# Patient Record
Sex: MALE | Race: WHITE | Employment: FULL TIME | URBAN - METROPOLITAN AREA
[De-identification: names, ages, dates, MRNs, and addresses within clinical notes are randomized per-mention and may not be internally consistent; named-entity substitution may affect disease eponyms.]

---

## 2020-12-27 ENCOUNTER — TELEPHONE (OUTPATIENT)
Dept: BEHAVIORAL HEALTH | Facility: CLINIC | Age: 35
End: 2020-12-27

## 2020-12-27 ENCOUNTER — HOSPITAL ENCOUNTER (INPATIENT)
Facility: CLINIC | Age: 35
LOS: 3 days | Discharge: HOME | DRG: 897 | End: 2020-12-30
Attending: FAMILY MEDICINE | Admitting: PSYCHIATRY & NEUROLOGY

## 2020-12-27 DIAGNOSIS — Z20.828 EXPOSURE TO SARS-ASSOCIATED CORONAVIRUS: ICD-10-CM

## 2020-12-27 DIAGNOSIS — F10.239 ALCOHOL DEPENDENCE WITH WITHDRAWAL WITH COMPLICATION (H): ICD-10-CM

## 2020-12-27 DIAGNOSIS — F32.1 CURRENT MODERATE EPISODE OF MAJOR DEPRESSIVE DISORDER, UNSPECIFIED WHETHER RECURRENT (H): Primary | ICD-10-CM

## 2020-12-27 LAB
ALBUMIN SERPL-MCNC: 3.6 G/DL (ref 3.4–5)
ALP SERPL-CCNC: 86 U/L (ref 40–150)
ALT SERPL W P-5'-P-CCNC: 119 U/L (ref 0–70)
AMPHETAMINES UR QL SCN: NEGATIVE
ANION GAP SERPL CALCULATED.3IONS-SCNC: 8 MMOL/L (ref 3–14)
AST SERPL W P-5'-P-CCNC: 124 U/L (ref 0–45)
BARBITURATES UR QL: NEGATIVE
BASOPHILS # BLD AUTO: 0.1 10E9/L (ref 0–0.2)
BASOPHILS NFR BLD AUTO: 1 %
BENZODIAZ UR QL: POSITIVE
BILIRUB SERPL-MCNC: 0.7 MG/DL (ref 0.2–1.3)
BUN SERPL-MCNC: 6 MG/DL (ref 7–30)
CALCIUM SERPL-MCNC: 9.6 MG/DL (ref 8.5–10.1)
CANNABINOIDS UR QL SCN: NEGATIVE
CHLORIDE SERPL-SCNC: 101 MMOL/L (ref 94–109)
CO2 SERPL-SCNC: 28 MMOL/L (ref 20–32)
COCAINE UR QL: NEGATIVE
CREAT SERPL-MCNC: 0.77 MG/DL (ref 0.66–1.25)
DIFFERENTIAL METHOD BLD: ABNORMAL
EOSINOPHIL # BLD AUTO: 0 10E9/L (ref 0–0.7)
EOSINOPHIL NFR BLD AUTO: 0.4 %
ERYTHROCYTE [DISTWIDTH] IN BLOOD BY AUTOMATED COUNT: 11.9 % (ref 10–15)
ETHANOL SERPL-MCNC: <0.01 G/DL
ETHANOL UR QL SCN: NEGATIVE
GFR SERPL CREATININE-BSD FRML MDRD: >90 ML/MIN/{1.73_M2}
GLUCOSE SERPL-MCNC: 105 MG/DL (ref 70–99)
HCT VFR BLD AUTO: 40.9 % (ref 40–53)
HGB BLD-MCNC: 13.6 G/DL (ref 13.3–17.7)
IMM GRANULOCYTES # BLD: 0 10E9/L (ref 0–0.4)
IMM GRANULOCYTES NFR BLD: 0.2 %
LABORATORY COMMENT REPORT: NORMAL
LYMPHOCYTES # BLD AUTO: 0.7 10E9/L (ref 0.8–5.3)
LYMPHOCYTES NFR BLD AUTO: 13.7 %
MCH RBC QN AUTO: 32.4 PG (ref 26.5–33)
MCHC RBC AUTO-ENTMCNC: 33.3 G/DL (ref 31.5–36.5)
MCV RBC AUTO: 97 FL (ref 78–100)
MONOCYTES # BLD AUTO: 0.8 10E9/L (ref 0–1.3)
MONOCYTES NFR BLD AUTO: 14.3 %
NEUTROPHILS # BLD AUTO: 3.7 10E9/L (ref 1.6–8.3)
NEUTROPHILS NFR BLD AUTO: 70.4 %
NRBC # BLD AUTO: 0 10*3/UL
NRBC BLD AUTO-RTO: 0 /100
OPIATES UR QL SCN: NEGATIVE
PLATELET # BLD AUTO: 108 10E9/L (ref 150–450)
POTASSIUM SERPL-SCNC: 3.6 MMOL/L (ref 3.4–5.3)
PROT SERPL-MCNC: 8.1 G/DL (ref 6.8–8.8)
RBC # BLD AUTO: 4.2 10E12/L (ref 4.4–5.9)
SARS-COV-2 RNA SPEC QL NAA+PROBE: NEGATIVE
SODIUM SERPL-SCNC: 137 MMOL/L (ref 133–144)
SPECIMEN SOURCE: NORMAL
WBC # BLD AUTO: 5.3 10E9/L (ref 4–11)

## 2020-12-27 PROCEDURE — 80307 DRUG TEST PRSMV CHEM ANLYZR: CPT | Performed by: FAMILY MEDICINE

## 2020-12-27 PROCEDURE — 99222 1ST HOSP IP/OBS MODERATE 55: CPT | Mod: 95 | Performed by: PSYCHIATRY & NEUROLOGY

## 2020-12-27 PROCEDURE — 128N000004 HC R&B CD ADULT

## 2020-12-27 PROCEDURE — 87635 SARS-COV-2 COVID-19 AMP PRB: CPT | Performed by: FAMILY MEDICINE

## 2020-12-27 PROCEDURE — 85025 COMPLETE CBC W/AUTO DIFF WBC: CPT | Performed by: FAMILY MEDICINE

## 2020-12-27 PROCEDURE — 99285 EMERGENCY DEPT VISIT HI MDM: CPT | Performed by: FAMILY MEDICINE

## 2020-12-27 PROCEDURE — 250N000013 HC RX MED GY IP 250 OP 250 PS 637: Performed by: PSYCHIATRY & NEUROLOGY

## 2020-12-27 PROCEDURE — 80320 DRUG SCREEN QUANTALCOHOLS: CPT | Performed by: FAMILY MEDICINE

## 2020-12-27 PROCEDURE — C9803 HOPD COVID-19 SPEC COLLECT: HCPCS

## 2020-12-27 PROCEDURE — 250N000013 HC RX MED GY IP 250 OP 250 PS 637: Performed by: FAMILY MEDICINE

## 2020-12-27 PROCEDURE — 99285 EMERGENCY DEPT VISIT HI MDM: CPT | Mod: 25

## 2020-12-27 PROCEDURE — HZ2ZZZZ DETOXIFICATION SERVICES FOR SUBSTANCE ABUSE TREATMENT: ICD-10-PCS | Performed by: FAMILY MEDICINE

## 2020-12-27 PROCEDURE — 80053 COMPREHEN METABOLIC PANEL: CPT | Performed by: FAMILY MEDICINE

## 2020-12-27 RX ORDER — TRAZODONE HYDROCHLORIDE 50 MG/1
50 TABLET, FILM COATED ORAL AT BEDTIME
Status: DISCONTINUED | OUTPATIENT
Start: 2020-12-27 | End: 2020-12-27

## 2020-12-27 RX ORDER — HYDROXYZINE HYDROCHLORIDE 25 MG/1
25 TABLET, FILM COATED ORAL EVERY 4 HOURS PRN
Status: DISCONTINUED | OUTPATIENT
Start: 2020-12-27 | End: 2020-12-30 | Stop reason: HOSPADM

## 2020-12-27 RX ORDER — MULTIPLE VITAMINS W/ MINERALS TAB 9MG-400MCG
1 TAB ORAL DAILY
Status: DISCONTINUED | OUTPATIENT
Start: 2020-12-27 | End: 2020-12-30 | Stop reason: HOSPADM

## 2020-12-27 RX ORDER — MULTIVITAMIN,THERAPEUTIC
1 TABLET ORAL DAILY
Status: ON HOLD | COMMUNITY
End: 2020-12-28

## 2020-12-27 RX ORDER — OLANZAPINE 10 MG/1
10 TABLET ORAL 3 TIMES DAILY PRN
Status: DISCONTINUED | OUTPATIENT
Start: 2020-12-27 | End: 2020-12-30 | Stop reason: HOSPADM

## 2020-12-27 RX ORDER — DIAZEPAM 10 MG
10 TABLET ORAL ONCE
Status: COMPLETED | OUTPATIENT
Start: 2020-12-27 | End: 2020-12-27

## 2020-12-27 RX ORDER — OLANZAPINE 10 MG/2ML
10 INJECTION, POWDER, FOR SOLUTION INTRAMUSCULAR 3 TIMES DAILY PRN
Status: DISCONTINUED | OUTPATIENT
Start: 2020-12-27 | End: 2020-12-30 | Stop reason: HOSPADM

## 2020-12-27 RX ORDER — VILAZODONE HYDROCHLORIDE 10 MG/1
10 TABLET ORAL DAILY
Status: DISCONTINUED | OUTPATIENT
Start: 2020-12-27 | End: 2020-12-30 | Stop reason: HOSPADM

## 2020-12-27 RX ORDER — GABAPENTIN 300 MG/1
300 CAPSULE ORAL 3 TIMES DAILY PRN
Status: DISCONTINUED | OUTPATIENT
Start: 2020-12-27 | End: 2020-12-30 | Stop reason: HOSPADM

## 2020-12-27 RX ORDER — LANOLIN ALCOHOL/MO/W.PET/CERES
100 CREAM (GRAM) TOPICAL DAILY
Status: DISCONTINUED | OUTPATIENT
Start: 2020-12-27 | End: 2020-12-30 | Stop reason: HOSPADM

## 2020-12-27 RX ORDER — FOLIC ACID 1 MG/1
1 TABLET ORAL DAILY
Status: DISCONTINUED | OUTPATIENT
Start: 2020-12-27 | End: 2020-12-30 | Stop reason: HOSPADM

## 2020-12-27 RX ORDER — CLONAZEPAM 1 MG/1
1 TABLET ORAL 2 TIMES DAILY PRN
Status: ON HOLD | COMMUNITY
End: 2020-12-28

## 2020-12-27 RX ORDER — DIAZEPAM 5 MG
5-20 TABLET ORAL EVERY 30 MIN PRN
Status: DISCONTINUED | OUTPATIENT
Start: 2020-12-27 | End: 2020-12-30

## 2020-12-27 RX ORDER — DIAZEPAM 5 MG
5 TABLET ORAL ONCE
Status: COMPLETED | OUTPATIENT
Start: 2020-12-27 | End: 2020-12-27

## 2020-12-27 RX ORDER — TRAZODONE HYDROCHLORIDE 50 MG/1
50 TABLET, FILM COATED ORAL
Status: ON HOLD | COMMUNITY
End: 2020-12-28

## 2020-12-27 RX ORDER — ACETAMINOPHEN 325 MG/1
650 TABLET ORAL EVERY 4 HOURS PRN
Status: DISCONTINUED | OUTPATIENT
Start: 2020-12-27 | End: 2020-12-30 | Stop reason: HOSPADM

## 2020-12-27 RX ORDER — VILAZODONE HYDROCHLORIDE 10 MG/1
10 TABLET ORAL DAILY
Status: ON HOLD | COMMUNITY
End: 2020-12-28

## 2020-12-27 RX ORDER — ATENOLOL 50 MG/1
50 TABLET ORAL DAILY PRN
Status: DISCONTINUED | OUTPATIENT
Start: 2020-12-27 | End: 2020-12-30

## 2020-12-27 RX ORDER — MAGNESIUM HYDROXIDE/ALUMINUM HYDROXICE/SIMETHICONE 120; 1200; 1200 MG/30ML; MG/30ML; MG/30ML
30 SUSPENSION ORAL EVERY 4 HOURS PRN
Status: DISCONTINUED | OUTPATIENT
Start: 2020-12-27 | End: 2020-12-30 | Stop reason: HOSPADM

## 2020-12-27 RX ADMIN — DIAZEPAM 10 MG: 5 TABLET ORAL at 15:14

## 2020-12-27 RX ADMIN — DIAZEPAM 10 MG: 5 TABLET ORAL at 20:19

## 2020-12-27 RX ADMIN — DIAZEPAM 10 MG: 10 TABLET ORAL at 10:52

## 2020-12-27 RX ADMIN — DIAZEPAM 10 MG: 5 TABLET ORAL at 16:19

## 2020-12-27 RX ADMIN — DIAZEPAM 10 MG: 5 TABLET ORAL at 18:02

## 2020-12-27 RX ADMIN — DIAZEPAM 5 MG: 5 TABLET ORAL at 11:57

## 2020-12-27 RX ADMIN — DIAZEPAM 10 MG: 10 TABLET ORAL at 13:50

## 2020-12-27 ASSESSMENT — ENCOUNTER SYMPTOMS
DIAPHORESIS: 1
SEIZURES: 0
TREMORS: 1
SLEEP DISTURBANCE: 1
HALLUCINATIONS: 0
NAUSEA: 1
NERVOUS/ANXIOUS: 1
VOMITING: 0

## 2020-12-27 ASSESSMENT — MIFFLIN-ST. JEOR: SCORE: 1477.84

## 2020-12-27 NOTE — H&P
Admitted:     12/27/2020      The patient was seen for 40 minutes via televisit (Cellmemore) on 12/27/2020.  The patient was hospitalized at [facility] station 3A, and this provider was in front of his home computer at his home office.  Reason for televisit (COVID-19 pandemic) was discussed with the patient, and patient consented to it.      CHIEF COMPLAINT AND REASON FOR ADMISSION:  The patient is a 35-year-old  male who presented to the Emergency Department for detoxification from alcohol.      HISTORY OF PRESENT ILLNESS:  The patient presents as a reasonably reliable historian and reports history of excessive alcohol drinking for many years.  He is originally from [place], but lives in [facility], came home to [place] to visit his family during Christmas, said that family encouraged him to enroll into chemical dependency treatment program in [place].  Reported that he has been increasingly drinking on pretty much a daily basis, averaging 5-6 drinks, typically hard liquor, per day.  He reports history of shakes but no seizures, no detox, insomnia, night sweats, anxiety, denied any other mental health symptoms.  He says that he does have a psychiatrist in [facility] who was treating him with Viibryd, Klonopin and naltrexone.      PAST PSYCHIATRIC HISTORY:  No previous psychiatric hospitalizations, no suicide attempts.  Reports 1 outpatient chemical dependency treatment program in [facility] area.  Drug of choice is alcohol.  Denies using any other drugs.  Urine drug screen was positive for benzodiazepines.  The patient states that he is officially prescribed Klonopin for anxiety.  He reports that in the past had panic attacks with sweating, heart palpitations, but not recently.      FAMILY AND SOCIAL HISTORY:  Originally from [place].  Has all his family, including 2 sisters, living here.  Went to [place].  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  Single, never , no kids.  Denies any family history of mental illness.      PAST MEDICAL HISTORY:  Essentially negative with exception of mental health.      ALLERGIES:  DENIED ANY KNOWN MEDICAL ALLERGIES.      HOME MEDICATIONS:   1.  Clonazepam 1 mg 2 times a day p.r.n. for anxiety.   2.  Trazodone 50 mg at bedtime.   3.  Viibryd 10 mg daily.   4.  Apparently naltrexone 25 mg or 50 mg daily.  The patient does not remember exact dose.      For physical examination and 12-point review of systems, please refer to Dr. Pierre's note from 12/27/2020.  I reviewed this note and agree with it.      VITAL SIGNS:  Temperature 97.6, respirations 16, heart rate 79, blood pressure 139/94.      MENTAL STATUS EXAMINATION:  The patient is an unshaven gentleman dressed in hospital garbs, who is overall cooperative during the interview, maintains fair eye contact, appears to be somewhat tense and mildly irritable during the interview.  Speech was spontaneous, rather monotone, with normal rate and volume.  Reported feeling stressed out but not depressed and anxious.  Affect restricted, congruent with mood.  Denied suicidal or homicidal ideation, auditory or visual hallucinations.  Thought processes were linear, goal-directed, associations tight.  He was alert and oriented x 3.  Fund of knowledge is average with proper usage of vocabulary.  Ability to focus and concentrate, immediate short and long-term memories were intact.  Gait and posture within normal limits.  Insight and judgment appeared to be fair.      IMPRESSION:     1.  Alcohol use disorder, moderate severity with alcohol withdrawal.     2.  Unspecified anxiety disorder (past diagnosis of panic attacks).      TREATMENT PLAN:  I discussed with the patient his plans for chemical dependency treatment.  He indicated that he would like to go to residential chemical dependency treatment program after he completes detoxification protocol.  He will be  restarted back on Viibryd, on naltrexone but not Klonopin.  We will use Valium for alcohol and benzodiazepine withdrawal.         JADEN GATES MD             D: 2020   T: 2020   MT: DELFINA      Name:     AMBERLY CHAVARRIA   MRN:      [id number]        Account:      [id number]   :      1985        Admitted:     2020                   Document: [id number]

## 2020-12-27 NOTE — ED PROVIDER NOTES
[facility] EMERGENCY DEPARTMENT ([facility])   December 27, 2020  ED 7 10:41 AM   History     Chief Complaint   Patient presents with     Mental Health Problem     ETOH withdrawal     The history is provided by the patient.     Wilber Chapman is a 35 year old male who presents seeking alcohol detox. Patient has been drinking heavily for past 2 months, drinking 5-6 glasses of bourbon, sometimes wine, daily. Last drank 2 days ago, on [place]. He has been feeling shaky for the past 2 days. He has had some withdrawal symptoms including insomnia, night sweats and anxiety. No vomiting, hallucinations. No history of DTs or alcohol withdrawal seizures. He is seeing a psychiatrist for Viibryd, klonopin and naltrexone (started 1 week ago). No history of detox or treatment. No medical issues, is healthy at baseline. No history of benzodiazepine or other drug abuse. No COVID-19 symptoms. He has been eating and drinking ok.     PAST MEDICAL HISTORY:   Past Medical History:   Diagnosis Date     Depressive disorder      Substance abuse (H)     alcohol       PAST SURGICAL HISTORY: No past surgical history on file.    Past medical history, past surgical history, medications, and allergies were reviewed with the patient. Additional pertinent items: None    FAMILY HISTORY: No family history on file.    SOCIAL HISTORY:   Social History     Tobacco Use     Smoking status: Not on file   Substance Use Topics     Alcohol use: Not on file     Social history was reviewed with the patient. Additional pertinent items: None      Patient's Medications    No medications on file        No Known Allergies     Review of Systems   Constitutional: Positive for diaphoresis (night sweats).   Gastrointestinal: Positive for nausea. Negative for vomiting.   Neurological: Positive for tremors. Negative for seizures.   Psychiatric/Behavioral: Positive for sleep disturbance. Negative for hallucinations and suicidal ideas. The patient is  nervous/anxious.    All other systems reviewed and are negative.    A complete review of systems was performed with pertinent positives and negatives noted in the HPI, and all other systems negative.    Physical Exam   BP: (!) 157/109  Pulse: 105  Temp: 99.1  F (37.3  C)  Resp: 20  SpO2: 98 %      Physical Exam  Constitutional:       General: He is not in acute distress.     Appearance: He is not diaphoretic.   HENT:      Head: Atraumatic.   Eyes:      General: No scleral icterus.     Pupils: Pupils are equal, round, and reactive to light.   Cardiovascular:      Rate and Rhythm: Regular rhythm. Tachycardia present.      Heart sounds: Normal heart sounds.   Pulmonary:      Effort: No respiratory distress.      Breath sounds: Normal breath sounds.   Abdominal:      General: Bowel sounds are normal.      Palpations: Abdomen is soft.      Tenderness: There is no abdominal tenderness.   Musculoskeletal:         General: No tenderness.   Skin:     General: Skin is warm.      Findings: No rash.   Neurological:      General: No focal deficit present.      Mental Status: He is alert and oriented to person, place, and time.      Comments: Patient is tremulous   Psychiatric:         Attention and Perception: Attention normal.         Mood and Affect: Mood is anxious.         Speech: Speech normal.         Behavior: Behavior normal.         Thought Content: Thought content normal.         Cognition and Memory: Cognition normal.         Judgment: Judgment normal.         ED Course        Procedures                           Results for orders placed or performed during the hospital encounter of 12/27/20 (from the past 24 hour(s))   CBC with platelets differential   Result Value Ref Range    WBC 5.3 4.0 - 11.0 10e9/L    RBC Count 4.20 (L) 4.4 - 5.9 10e12/L    Hemoglobin 13.6 13.3 - 17.7 g/dL    Hematocrit 40.9 40.0 - 53.0 %    MCV 97 78 - 100 fl    MCH 32.4 26.5 - 33.0 pg    MCHC 33.3 31.5 - 36.5 g/dL    RDW 11.9 10.0 - 15.0 %     Platelet Count 108 (L) 150 - 450 10e9/L    Diff Method Automated Method     % Neutrophils 70.4 %    % Lymphocytes 13.7 %    % Monocytes 14.3 %    % Eosinophils 0.4 %    % Basophils 1.0 %    % Immature Granulocytes 0.2 %    Nucleated RBCs 0 0 /100    Absolute Neutrophil 3.7 1.6 - 8.3 10e9/L    Absolute Lymphocytes 0.7 (L) 0.8 - 5.3 10e9/L    Absolute Monocytes 0.8 0.0 - 1.3 10e9/L    Absolute Eosinophils 0.0 0.0 - 0.7 10e9/L    Absolute Basophils 0.1 0.0 - 0.2 10e9/L    Abs Immature Granulocytes 0.0 0 - 0.4 10e9/L    Absolute Nucleated RBC 0.0    Comprehensive metabolic panel   Result Value Ref Range    Sodium 137 133 - 144 mmol/L    Potassium 3.6 3.4 - 5.3 mmol/L    Chloride 101 94 - 109 mmol/L    Carbon Dioxide 28 20 - 32 mmol/L    Anion Gap 8 3 - 14 mmol/L    Glucose 105 (H) 70 - 99 mg/dL    Urea Nitrogen 6 (L) 7 - 30 mg/dL    Creatinine 0.77 0.66 - 1.25 mg/dL    GFR Estimate >90 >60 mL/min/[1.73_m2]    GFR Estimate If Black >90 >60 mL/min/[1.73_m2]    Calcium 9.6 8.5 - 10.1 mg/dL    Bilirubin Total 0.7 0.2 - 1.3 mg/dL    Albumin 3.6 3.4 - 5.0 g/dL    Protein Total 8.1 6.8 - 8.8 g/dL    Alkaline Phosphatase 86 40 - 150 U/L     (H) 0 - 70 U/L     (H) 0 - 45 U/L   Asymptomatic SARS-CoV-2 COVID-19 Virus (Coronavirus) by PCR    Specimen: Nasopharyngeal   Result Value Ref Range    SARS-CoV-2 Virus Specimen Source Nasopharyngeal     SARS-CoV-2 PCR Result NEGATIVE     SARS-CoV-2 PCR Comment (Note)    Alcohol ethyl   Result Value Ref Range    Ethanol g/dL <0.01 <0.01 g/dL     Medications   diazepam (VALIUM) tablet 5 mg (has no administration in time range)   diazepam (VALIUM) tablet 10 mg (10 mg Oral Given 12/27/20 1052)             Assessments & Plan (with Medical Decision Making)   35-year-old male with a history of depression and alcohol abuse is presenting seeking detox from alcohol.  Has been on a binge of drinking for several months and attempted to stop on his own 2 days ago.  Since attempting to stop  he has developed restlessness, insomnia, sweats, and diffuse tremor.  Currently he is presenting tremulous tachycardic and appears to be in active alcohol withdrawal.  His mental status is normal, and he has no history of DTs or seizures.  Patient was treated with Valium, labs obtained.  He appears to be an appropriate candidate for admission to our detox unit on a voluntary basis.  Covid testing was also sent.  His Covid testing is negative.  His labs reveal sequelae of chronic alcoholism including mild transaminase elevation and thrombocytopenia.  He is medically stable for detox admission.  He is improving with oral Valium given in the ED.  We will continue to monitor through the [facility] protocol while he is in the ED.    I have reviewed the nursing notes.    I have reviewed the findings, diagnosis, plan and need for follow up with the patient.    New Prescriptions    No medications on file       Final diagnoses:   Alcohol dependence with withdrawal with complication (H)     I, Diedra Troy, am serving as a trained medical scribe to document services personally performed by Wilber Pierre MD based on the provider's statements to me on December 27, 2020.  This document has been checked and approved by the attending provider.    IWilber MD, was physically present and have reviewed and verified the accuracy of this note documented by Deidra Troy medical scribe.     12/27/2020   [facility] EMERGENCY DEPARTMENT     Wilber Pierre MD  12/27/20 4804

## 2020-12-27 NOTE — PLAN OF CARE
Problem: Substance Withdrawal  Goal: Substance Withdrawal  Description: Signs and symptoms of listed problems will be absent or manageable.    1. Detoxification from Alcohol using the [facility] valium protocol  2. Patient will complete assessment paperwork  3.  Patient will meet with  to discuss treatment and discharge planning  4. Physical examination and Lab evaluation by MD  5. Patients oral intake will be greater than 75 % of meals to meet estimated needs  6. Adequate fluid intake    Outcome: Declining  Flowsheets (Taken 12/27/2020 1545)  Substance Withdrawal Assessed: all  Substance Withdrawal Present:    anxiety    insight    affect    mood      35 year old male admitted for treatment and evaluation of alcohol use.   Pt reports using 5-6 glasses of bourbon and or some wine.   States he last used 12- one bottle of wine.   Pt denies hx of withdrawal seizures or DT's.  Pt presents on admission with elevated VS and 4+ hand tremors.     Pt has no previous history of attending detox or CD treatments.     Pt here in [place] for the holiday's to visit his mother and sister who has not seen in a long time and his sister encouraged him  to come to ER for help with his alcohol use.     Pt has lived in [place] for over 10 years. 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 Pt states he has a KATLIN.     He states he was laid off this summer for a few months related to covid and that is when his drinking really increased.     Pt recently was seen in [place] by a doctor on Monday, December 21, 2020 at which time he was prescribed several medications including viibryd, klonopin and trazodone. States he was taking the klonopin ?1 mg 1-2 a day infrequently. Last used all of these medications this am.     Pt says he has been working with a counselor in [place] regarding treatment plans and wants to return to [place] upon discharge.     Pt denies past or present SI/SA. Pt reports he does feel  depressed. Also reporting sleeping issue with waking up in the middle of the night.     Pt received valium in the emergency room.   Pt seen by Dr. Mauro via tele medicine.     [facility] with valium ordered.

## 2020-12-27 NOTE — TELEPHONE ENCOUNTER
.Patient cleared and ready for behavioral bed placement: Yes       S: MD Pierre. [facility]. Ari 35y. Alcohol Detox     B: 35y/M. DX of Depression. Pt reported drinking 6 glasses of Terril daily  for 2 months, pt is active withdrawal, hasn't drink alcohol since Chrismas. He reports shakes and sweats. No DT. No seizures. Medical Cleared. Covid negative. No other substance use concerns   UTOX: order. Positive for Benzo, pt was given benzo in ED   .       A: Voluntary    R: rosey Rodas at 12:10pm  Presented pt to [facility] for 3A    Notified charge nurse on unit on placement @12:18pm  Notified ED about placement

## 2020-12-27 NOTE — PROGRESS NOTES
12/27/20 1540   Patient Belongings   Did you bring any home meds/supplements to the hospital?  No   Patient Belongings other (see comments)   Patient Belongings Put in Hospital Secure Location (Security or Locker, etc.) other (see comments)   Belongings Search Yes   Clothing Search Yes   Second Staff Ricardo     STORAGE BIN:   Shoes, coat, belt, hat, wallet, keys, earbuds    MED ROOM BIN:   Cell phone    SECURITY:   4 visa, 1 MC, 1 AmEx, 3 id  A             Admission:  I am responsible for any personal items that are not sent to the safe or pharmacy.  [facility] is not responsible for loss, theft or damage of any property in my possession.    Signature:  _________________________________ Date: _______  Time: _____                                              Staff Signature:  ____________________________ Date: ________  Time: _____      2nd Staff person, if patient is unable/unwilling to sign:    Signature: ________________________________ Date: ________  Time: _____   Discharge:  [facility] has returned all of my personal belongings:    Signature: _________________________________ Date: ________  Time: _____                                          Staff Signature:  ____________________________ Date: ________  Time: _____

## 2020-12-27 NOTE — ED NOTES
Performed safety screen. Removed Shoes, jacket. (pt retained cell phone). To security area. AIDET.

## 2020-12-27 NOTE — ED NOTES
Pt older sister drove pt in. Pt verbally agreeing to updating and MD talking with sister. Sister: Aminata [phone]

## 2020-12-28 LAB — TSH SERPL DL<=0.005 MIU/L-ACNC: 1.33 MU/L (ref 0.4–4)

## 2020-12-28 PROCEDURE — 250N000013 HC RX MED GY IP 250 OP 250 PS 637: Performed by: NURSE PRACTITIONER

## 2020-12-28 PROCEDURE — 250N000013 HC RX MED GY IP 250 OP 250 PS 637: Performed by: PSYCHIATRY & NEUROLOGY

## 2020-12-28 PROCEDURE — 84443 ASSAY THYROID STIM HORMONE: CPT | Performed by: PSYCHIATRY & NEUROLOGY

## 2020-12-28 PROCEDURE — 128N000004 HC R&B CD ADULT

## 2020-12-28 PROCEDURE — 99231 SBSQ HOSP IP/OBS SF/LOW 25: CPT | Performed by: NURSE PRACTITIONER

## 2020-12-28 PROCEDURE — 36416 COLLJ CAPILLARY BLOOD SPEC: CPT | Performed by: PSYCHIATRY & NEUROLOGY

## 2020-12-28 PROCEDURE — 99207 PR CONSULT E&M CHANGED TO SUBSEQUENT LEVEL: CPT | Performed by: NURSE PRACTITIONER

## 2020-12-28 PROCEDURE — 99232 SBSQ HOSP IP/OBS MODERATE 35: CPT | Mod: 95 | Performed by: PSYCHIATRY & NEUROLOGY

## 2020-12-28 RX ORDER — MULTIPLE VITAMINS W/ MINERALS TAB 9MG-400MCG
1 TAB ORAL DAILY
Qty: 30 TABLET | Refills: 1 | Status: SHIPPED | OUTPATIENT
Start: 2020-12-29

## 2020-12-28 RX ORDER — FOLIC ACID 1 MG/1
1 TABLET ORAL DAILY
Qty: 30 TABLET | Refills: 1 | Status: SHIPPED | OUTPATIENT
Start: 2020-12-29

## 2020-12-28 RX ORDER — GABAPENTIN 300 MG/1
300 CAPSULE ORAL 3 TIMES DAILY PRN
Qty: 60 CAPSULE | Refills: 1 | Status: SHIPPED | OUTPATIENT
Start: 2020-12-28

## 2020-12-28 RX ORDER — VILAZODONE HYDROCHLORIDE 10 MG/1
10 TABLET ORAL DAILY
Qty: 30 TABLET | Refills: 1 | Status: SHIPPED | OUTPATIENT
Start: 2020-12-28

## 2020-12-28 RX ORDER — NALTREXONE HYDROCHLORIDE 50 MG/1
25 TABLET, FILM COATED ORAL DAILY
Status: ON HOLD | COMMUNITY
End: 2020-12-28

## 2020-12-28 RX ORDER — NALTREXONE HYDROCHLORIDE 50 MG/1
50 TABLET, FILM COATED ORAL DAILY
Qty: 30 TABLET | Refills: 3 | Status: SHIPPED | OUTPATIENT
Start: 2020-12-28

## 2020-12-28 RX ADMIN — FOLIC ACID 1 MG: 1 TABLET ORAL at 08:49

## 2020-12-28 RX ADMIN — Medication 25 MG: at 08:49

## 2020-12-28 RX ADMIN — Medication 5 MG: at 20:18

## 2020-12-28 RX ADMIN — MULTIPLE VITAMINS W/ MINERALS TAB 1 TABLET: TAB at 08:49

## 2020-12-28 RX ADMIN — Medication 100 MG: at 08:49

## 2020-12-28 RX ADMIN — DIAZEPAM 10 MG: 5 TABLET ORAL at 20:18

## 2020-12-28 RX ADMIN — DIAZEPAM 10 MG: 5 TABLET ORAL at 12:13

## 2020-12-28 RX ADMIN — DIAZEPAM 10 MG: 5 TABLET ORAL at 04:46

## 2020-12-28 RX ADMIN — DIAZEPAM 10 MG: 5 TABLET ORAL at 08:49

## 2020-12-28 RX ADMIN — VILAZODONE HYDROCHLORIDE 10 MG: 10 TABLET ORAL at 08:49

## 2020-12-28 NOTE — PHARMACY-ADMISSION MEDICATION HISTORY
Admission Medication History Completed by Pharmacy    See [facility] Admission Navigator for allergy information, preferred outpatient pharmacy, prior to admission medications and immunization status.     Medication history sources:  [facility] ([phone])    Changes made to PTA medication list (reason)  Added:   - naltrexone  Deleted: N/A  Changed:   - trazodone-->PRN (last filled in April per [facility])    Additional medication history information:   - Patient not interviewed as part of this medication history. Please discuss any OTC/non-prescription medication use and last doses with the patient that may not be reflected in the list below.    Prior to Admission medications    Medication Sig Last Dose Taking? Auth Provider   clonazePAM (KLONOPIN) 1 MG tablet Take 1 mg by mouth 2 times daily as needed for anxiety or withdrawal 12/27/2020 Yes Reported, Patient   multivitamin, therapeutic (THERA-VIT) TABS tablet Take 1 tablet by mouth daily 12/27/2020 Yes Unknown, Entered By History   naltrexone (DEPADE/REVIA) 50 MG tablet Take 25 mg by mouth daily  Yes Unknown, Entered By History   traZODone (DESYREL) 50 MG tablet Take 50 mg by mouth nightly as needed  12/27/2020 Yes Reported, Patient   vilazodone (VIIBRYD) 10 MG TABS tablet Take 10 mg by mouth daily 12/27/2020 Yes Reported, Patient     Date completed: 12/28/20    Medication history completed by:   Yang Lee, PharmD, BCPS  [facility] Building: [facility] *[id number]

## 2020-12-28 NOTE — PROGRESS NOTES
"[facility] [place]   Psychiatric Progress Note        Interim History:   The patient's care was discussed with the treatment team during the daily team meeting and/or staff's chart notes were reviewed.  Staff report patient is still in detox. Very pleasant.     The patient reports that he is doing \"a little better.\" Sleeping and eating well. Still having some shakes. Denies SI. Has not been using Klonopin recently and doesn't feel that he is going through any benzodiazepine withdrawal. Plans to return to [facility] and enter CD treatment there.          Medications:       folic acid  1 mg Oral Daily     multivitamin w/minerals  1 tablet Oral Daily     naltrexone  25 mg Oral Daily     thiamine  100 mg Oral Daily     vilazodone  10 mg Oral Daily          Allergies:   No Known Allergies       Labs:     Recent Results (from the past 24 hour(s))   Drug abuse screen 6 urine (chem dep)    Collection Time: 12/27/20 12:53 PM   Result Value Ref Range    Amphetamine Qual Urine Negative NEG^Negative    Barbiturates Qual Urine Negative NEG^Negative    Benzodiazepine Qual Urine Positive (A) NEG^Negative    Cannabinoids Qual Urine Negative NEG^Negative    Cocaine Qual Urine Negative NEG^Negative    Ethanol Qual Urine Negative NEG^Negative    Opiates Qualitative Urine Negative NEG^Negative   TSH with free T4 reflex    Collection Time: 12/28/20  7:41 AM   Result Value Ref Range    TSH 1.33 0.40 - 4.00 mU/L          Psychiatric Examination:     /88 (BP Location: Left arm)   Pulse 123   Temp 97.9  F (36.6  C) (Temporal)   Resp 16   Ht 1.651 m (5' 5\")   Wt 62.6 kg (138 lb)   SpO2 99%   BMI 22.96 kg/m    Weight is 138 lbs 0 oz  Body mass index is 22.96 kg/m .  Orthostatic Vitals     None            Appearance: awake, alert and adequately groomed  Attitude:  cooperative  Eye Contact:  good  Mood:  better  Affect:  mood congruent  Speech:  clear, coherent  Psychomotor Behavior:  no evidence of tardive " dyskinesia, dystonia, or tics  Thought Process:  goal oriented  Associations:  no loose associations  Thought Content:  no evidence of suicidal ideation or homicidal ideation and no evidence of psychotic thought  Insight:  fair  Judgement:  intact  Oriented to:  time, person, and place  Attention Span and Concentration:  intact  Recent and Remote Memory:  fair            Precautions:     Behavioral Orders   Procedures     Code 1 - Restrict to Unit     Routine Programming     As clinically indicated     Status 15     Every 15 minutes.     Withdrawal precautions          Diagnoses:     Alcohol withdrawal with unspecified complication   MDD, recurrent, moderate         Plan:     1) Continue MSSA protocol.   2) Continue medications as above - will restart Naltrexone at discharge. Medications ordered.   3) Patient will followup with CD treatment when he returns to [place].       Disposition Plan   Reason for ongoing admission: requires detoxification from substance that poses a risk of bodily harm during withdrawal period  Discharge location: home with family  Discharge Medications: ordered.  Follow-up Appointments: not scheduled  Legal Status: voluntary    Video-Visit Details    Type of service:  Video Visit    Video Start Time (time video started): 0850    Video End Time (time video stopped): 0900    Originating Location (pt. Location): Other Station 3A    Distant Location (provider location): Provider remote location    Mode of Communication:  Video Conference via [employer]    Physician has received verbal consent for a Video Visit from the patient? Yes    Entered by: Ricardo Mcgowan on 12/28/2020 at 11:02 AM

## 2020-12-28 NOTE — PLAN OF CARE
Behavioral Team Discussion: (12/28/2020)    Continued Stay Criteria/Rationale: Patient admitted for alcohol withdrawal and alcohol Use Disorder.  Plan: The following services will be provided to the patient; psychiatric assessment, medication management, therapeutic milieu, individual and group support, and skills groups.   Participants: 3A Provider: Dr. Ricardo Mcgowan MD; 3A RN's: Stiven Tello, RN; 3A CM's: Latisha Cano .  Summary/Recommendation: Providers will assess today for treatment recommendations, discharge planning, and aftercare plans. CM will meet with pt for discharge planning.   Medical/Physical: None noted  Precautions:   Behavioral Orders   Procedures     Code 1 - Restrict to Unit     Routine Programming     As clinically indicated     Status 15     Every 15 minutes.     Withdrawal precautions     Rationale for change in precautions or plan: N/A  Progress: No Change.

## 2020-12-28 NOTE — PROGRESS NOTES
Met with Pt to initiate discharge planning.  Pt plans to return to [place] following discharge and will follow up with providers there for treatment.  Advised Pt to request assistance if needs arise.  Pt acknowledged.

## 2020-12-28 NOTE — CONSULTS
Internal Medicine Initial Visit      Wilber Chapman MRN# [id number]   YOB: 1985 Age: 35 year old   Date of Admission: 12/27/2020  PCP: No Ref-Primary, Physician    Referring Provider: Behavioral Health - Gurwinder García MD  Reason for Visit: General Medical Evaluation          Assessment and Recommendations:   Wilber Chapman is a 35 year old year old male with a history of depressive disorder and substance use who is admitted to station 3A seeking alcohol detox. Internal Medicine consultation was ordered by Dr. Mcgowan for general medical evaluation       Alcohol withdrawal:  Alcohol use disorder:   Per ED: reported heavy drinking for ~2 months, 5-6 glasses bourbon daily and sometimes wine. Last drink reported on 12/25.  Patient denies ever having history of alcohol withdrawal seizure.  -Management per psychiatry team.   -MSSA protocol in place.    Depression:  Anxiety:   Home medications include vilazodone and clonazepam.    -Continues on vilazadone per psychiatry.    Medicine will sign off, please page with any additional concerns.     [facility]  Hospitalist Service      Reason for Admission:   Alcohol detox         History of Present Illness:   History is obtained from the patient and medical record.     Wilber Chapman is a 35 year old year old male with a history of depression, anxiety, and alcohol use disorder Admitted to behavioral health for alcohol detox.    Internal Medicine service was asked to see patient for a general medication evaluation. Currently, patient is experiencing symptoms of alcohol.  He is being treated with Valium per MSSA protocol.          Review of Systems:   Constitutional: negative for fever/chills.  Reports occasional night sweats with alcohol withdrawal.  Ears/Nose/Throat: negative for URI symptoms  Cardiovascular: negative for chest pain  Respiratory: negative for cough or shortness of breath   Gastrointestinal: negative for abdominal  pain  Neurologic: Tremor related to alcohol withdrawal          Past Medical History:   Reviewed and updated in Epic.  Past Medical History:   Diagnosis Date     Depressive disorder      Substance abuse (H)     alcohol             Past Surgical History:   Reviewed and updated in Epic.  No past surgical history.          Social History:     Social History     Tobacco Use     Smoking status: Not on file   Substance Use Topics     Alcohol use: Not on file     Drug use: Not on file             Family History:   Reviewed and updated in Epic.  Reports history of postpartum depression in sister.  Denies any other pertinent family medical history.          Allergies:   No Known Allergies          Medications:     Medications Prior to Admission   Medication Sig Dispense Refill Last Dose     clonazePAM (KLONOPIN) 1 MG tablet Take 1 mg by mouth 2 times daily as needed for anxiety or withdrawal   12/27/2020     multivitamin, therapeutic (THERA-VIT) TABS tablet Take 1 tablet by mouth daily   12/27/2020     naltrexone (DEPADE/REVIA) 50 MG tablet Take 25 mg by mouth daily        traZODone (DESYREL) 50 MG tablet Take 50 mg by mouth nightly as needed    12/27/2020     vilazodone (VIIBRYD) 10 MG TABS tablet Take 10 mg by mouth daily   12/27/2020        Current Facility-Administered Medications   Medication     acetaminophen (TYLENOL) tablet 650 mg     alum & mag hydroxide-simethicone (MAALOX) suspension 30 mL     atenolol (TENORMIN) tablet 50 mg     diazepam (VALIUM) tablet 5-20 mg     folic acid (FOLVITE) tablet 1 mg     gabapentin (NEURONTIN) capsule 300 mg     hydrOXYzine (ATARAX) tablet 25 mg     melatonin tablet 5 mg     multivitamin w/minerals (THERA-VIT-M) tablet 1 tablet     naltrexone (DEPADE;REVIA) half-tab 25 mg     OLANZapine (zyPREXA) tablet 10 mg    Or     OLANZapine (zyPREXA) injection 10 mg     thiamine (B-1) tablet 100 mg     vilazodone (VIIBRYD) tablet 10 mg            Physical Exam:   Blood pressure 133/88, pulse  "123, temperature 97.9  F (36.6  C), temperature source Temporal, resp. rate 16, height 1.651 m (5' 5\"), weight 62.6 kg (138 lb), SpO2 99 %.  Body mass index is 22.96 kg/m .  GENERAL: Alert and oriented x 3.   HEENT: Anicteric sclera. Mucous membranes moist.   CV: Regular rhythm.  Tachycardic.. S1, S2. No murmurs appreciated.   RESPIRATORY: Effort normal on room air. Lungs CTAB with no wheezing, rales, rhonchi.   GI: Abdomen non distended  NEUROLOGICAL: No focal deficits noted. Moves all extremities.   SKIN: No jaundice          Data:   CBC:  Recent Labs   Lab Test 12/27/20  1101   WBC 5.3   RBC 4.20*   HGB 13.6   HCT 40.9   MCV 97   MCH 32.4   MCHC 33.3   RDW 11.9   *       CMP:  Recent Labs   Lab Test 12/27/20  1101      POTASSIUM 3.6   CHLORIDE 101   CLAIR 9.6   CO2 28   BUN 6*   CR 0.77   *   *   *   BILITOTAL 0.7   ALBUMIN 3.6   PROTTOTAL 8.1   ALKPHOS 86       TSH:  TSH   Date Value Ref Range Status   12/28/2020 1.33 0.40 - 4.00 mU/L Final       Tox screen: Urine tox screen on admission was positive for benzodiazepines.  All others negative.    Unresulted Labs Ordered in the Past 30 Days of this Admission     No orders found for last 31 day(s).           "

## 2020-12-28 NOTE — PLAN OF CARE
Pt was visible in the milieu watching football with peers. He appears uncomfortable and seems to be minimizing symptoms. Pt was able to eat 25% of his dinner. MSSA scores of 8,12, and 9 given 30 mg of valium during shift. Pt denies SI/HI/SIB.

## 2020-12-28 NOTE — PROGRESS NOTES
MSSA scores of 4/8 pt received 10mg of Valium this shift and is observed to sleep throughout the noc.

## 2020-12-29 PROCEDURE — H2035 A/D TX PROGRAM, PER HOUR: HCPCS

## 2020-12-29 PROCEDURE — 128N000004 HC R&B CD ADULT

## 2020-12-29 PROCEDURE — 99232 SBSQ HOSP IP/OBS MODERATE 35: CPT | Mod: 95 | Performed by: PSYCHIATRY & NEUROLOGY

## 2020-12-29 PROCEDURE — 250N000013 HC RX MED GY IP 250 OP 250 PS 637: Performed by: NURSE PRACTITIONER

## 2020-12-29 PROCEDURE — 250N000013 HC RX MED GY IP 250 OP 250 PS 637: Performed by: PSYCHIATRY & NEUROLOGY

## 2020-12-29 RX ADMIN — HYDROXYZINE HYDROCHLORIDE 25 MG: 25 TABLET, FILM COATED ORAL at 20:17

## 2020-12-29 RX ADMIN — VILAZODONE HYDROCHLORIDE 10 MG: 10 TABLET ORAL at 08:42

## 2020-12-29 RX ADMIN — FOLIC ACID 1 MG: 1 TABLET ORAL at 08:43

## 2020-12-29 RX ADMIN — MULTIPLE VITAMINS W/ MINERALS TAB 1 TABLET: TAB at 08:43

## 2020-12-29 RX ADMIN — Medication 5 MG: at 22:21

## 2020-12-29 RX ADMIN — HYDROXYZINE HYDROCHLORIDE 25 MG: 25 TABLET, FILM COATED ORAL at 11:58

## 2020-12-29 RX ADMIN — DIAZEPAM 10 MG: 5 TABLET ORAL at 08:42

## 2020-12-29 RX ADMIN — Medication 25 MG: at 08:42

## 2020-12-29 RX ADMIN — DIAZEPAM 10 MG: 5 TABLET ORAL at 05:04

## 2020-12-29 RX ADMIN — HYDROXYZINE HYDROCHLORIDE 25 MG: 25 TABLET, FILM COATED ORAL at 16:22

## 2020-12-29 RX ADMIN — Medication 100 MG: at 08:43

## 2020-12-29 NOTE — PROGRESS NOTES
"[facility], [place]   Psychiatric Progress Note        Interim History:   The patient's care was discussed with the treatment team during the daily team meeting and/or staff's chart notes were reviewed.  Staff report patient is still in detox. Doing well on the unit.     The patient reports that he is doing well. Denies any withdrawal symptoms currently. Sleeping and eating well. Denies SI. Hopeful to discharge tomorrow to his family's care and they will arrange a return to [place].          Medications:       folic acid  1 mg Oral Daily     multivitamin w/minerals  1 tablet Oral Daily     naltrexone  25 mg Oral Daily     thiamine  100 mg Oral Daily     vilazodone  10 mg Oral Daily          Allergies:   No Known Allergies       Labs:     No results found for this or any previous visit (from the past 24 hour(s)).       Psychiatric Examination:     BP (!) 145/89   Pulse 89   Temp 97.2  F (36.2  C) (Temporal)   Resp 16   Ht 1.651 m (5' 5\")   Wt 62.6 kg (138 lb)   SpO2 99%   BMI 22.96 kg/m    Weight is 138 lbs 0 oz  Body mass index is 22.96 kg/m .  Orthostatic Vitals     None            Appearance: awake, alert and adequately groomed  Attitude:  cooperative  Eye Contact:  good  Mood:  good  Affect:  mood congruent  Speech:  clear, coherent  Psychomotor Behavior:  no evidence of tardive dyskinesia, dystonia, or tics  Thought Process:  goal oriented  Associations:  no loose associations  Thought Content:  no evidence of suicidal ideation or homicidal ideation and no evidence of psychotic thought  Insight:  fair  Judgement:  intact  Oriented to:  time, person, and place  Attention Span and Concentration:  intact  Recent and Remote Memory:  fair            Precautions:     Behavioral Orders   Procedures     Code 1 - Restrict to Unit     Routine Programming     As clinically indicated     Status 15     Every 15 minutes.     Withdrawal precautions          Diagnoses:     Alcohol withdrawal " with unspecified complication   MDD, recurrent, moderate         Plan:     1) Continue MSSA protocol.   2) Continue medications as above - will restart Naltrexone at discharge. Medications ordered.   3) Patient will followup with CD treatment when he returns to [place].       Disposition Plan   Reason for ongoing admission: requires detoxification from substance that poses a risk of bodily harm during withdrawal period  Discharge location: home with family  Discharge Medications: ordered.  Follow-up Appointments: not scheduled  Legal Status: voluntary    Video-Visit Details    Type of service:  Video Visit    Video Start Time (time video started): 0900    Video End Time (time video stopped): 0905    Originating Location (pt. Location): Other Station 3A    Distant Location (provider location): Provider remote location    Mode of Communication:  Video Conference via [employer]    Physician has received verbal consent for a Video Visit from the patient? Yes    Entered by: Ricardo Mcgowan on 12/29/2020 at 10:51 AM

## 2020-12-29 NOTE — PLAN OF CARE
Pt was visible in the milieu. He was reading in the lounge most of the evening. He is eating and drinking appropriately. Denies SI/HI/SIB. MSSA scores of 6 and 8 given 10 mg of Valium per unit protocol. Denies any other concerns.

## 2020-12-29 NOTE — PROGRESS NOTES
MSSA scores of 6/8 pt receives 10g of valium for alcohol withdrawal this shift.Pt is observed to sleep throughout the noc.

## 2020-12-30 VITALS
DIASTOLIC BLOOD PRESSURE: 87 MMHG | OXYGEN SATURATION: 100 % | WEIGHT: 138 LBS | TEMPERATURE: 97.4 F | SYSTOLIC BLOOD PRESSURE: 135 MMHG | HEART RATE: 93 BPM | BODY MASS INDEX: 22.99 KG/M2 | RESPIRATION RATE: 16 BRPM | HEIGHT: 65 IN

## 2020-12-30 PROCEDURE — 250N000013 HC RX MED GY IP 250 OP 250 PS 637: Performed by: PSYCHIATRY & NEUROLOGY

## 2020-12-30 PROCEDURE — 99239 HOSP IP/OBS DSCHRG MGMT >30: CPT | Performed by: PSYCHIATRY & NEUROLOGY

## 2020-12-30 RX ORDER — HYDROXYZINE HYDROCHLORIDE 25 MG/1
25 TABLET, FILM COATED ORAL EVERY 4 HOURS PRN
Qty: 60 TABLET | Refills: 1 | Status: SHIPPED | OUTPATIENT
Start: 2020-12-30

## 2020-12-30 RX ADMIN — VILAZODONE HYDROCHLORIDE 10 MG: 10 TABLET ORAL at 08:25

## 2020-12-30 RX ADMIN — MULTIPLE VITAMINS W/ MINERALS TAB 1 TABLET: TAB at 08:25

## 2020-12-30 RX ADMIN — Medication 100 MG: at 08:25

## 2020-12-30 RX ADMIN — Medication 25 MG: at 08:25

## 2020-12-30 RX ADMIN — FOLIC ACID 1 MG: 1 TABLET ORAL at 08:25

## 2020-12-30 RX ADMIN — HYDROXYZINE HYDROCHLORIDE 25 MG: 25 TABLET, FILM COATED ORAL at 08:28

## 2020-12-30 NOTE — PLAN OF CARE
Pt discharged with plan to follow up with therapy, psychiatrist and AA at home in [place]. AVS and labs reviewed with patient, all questions answered and copies sent with pt. Pt discharged with all medications and belongings. Pt escorted off unit by [facility]

## 2020-12-30 NOTE — DISCHARGE INSTRUCTIONS
Behavioral Discharge Planning and Instructions  THANK YOU FOR CHOOSING THE [address]  [phone]    Summary: You were admitted to Station [facility]A on 12/27/2020 for detoxification from alcohol.  A medical exam was performed that included lab work. You have met with a  and opted to return to [place] to pursue treatment options.  Please take care and make your recovery a priority, Wilber!    Recommendation:  Work with  and addiction professionals to determine an appropriate level of care for treatment.    Main Diagnosis: Per Dr. Roslyn MD;  303.90 (F10.20) Alcohol Use Disorder Severe      Major Treatments, Procedures and Findings:  You were detoxed from alcohol with the Modified Selective Severity Protocol using Valium. You have met with a  to develop a treatment plan for discharge.  You have had labs drawn and a copy of those labs will be sent home with you.  Please bring your lab results with to your follow up doctor appointment.    Symptoms to Report:  If you experience more anxiety, confusion, sleeplessness, deep sadness or thoughts of suicide, notify your treatment team or notify your primary care physician. IF ANY OF THE SYMPTOMS YOU ARE EXPERIENCING ARE A MEDICAL EMERGENCY CALL 911 IMMEDIATELY.     Lifestyle Adjustment: Adjust your lifestyle to get enough sleep, relaxation, exercise and  good nutrition. Continue to develop healthy coping skills to decrease stress and promote a sober living environment. Do not use alcohol, illegal drugs or addictive medications other than what is currently prescribed. AA, NA, and  Sponsor are excellent resources for support.     Primary Provider: Please follow up with your primary care provider within 30 days of discharge    Disposition: Returning to [place]      Facts about COVID19 at www.cdc.gov/COVID19 and www.[place].gov/covid19    Keeping hands clean is one of the most important steps we can take to avoid getting sick and  "spreading germs to others.  Please wash your hands frequently and lather with soap for at least 20 seconds!    Follow-up Appointment:   Please make a follow up appointment with your psychiatrist for within 30 days of discharge     Resources:     Resources for on line recovery meetings:      *due to covid-19 AA/NA meetings are being held online*      AA meetings can be found online; search for them at: http://aa-intergroup.org/directory.php  AA meetings via [place] for [place] area can be found online at: https://a[place].org/find-a-meeting/holiday-closings/  NA meetings via [place] for [place] area can be found online at: https://sites.1stdibs.com/view/[place]regionofnarcoticsanonymous/home?authuser=2    Www.Gonway  has online resources for meeting and recovery care including Podcast \"Let's Talk:Addiction & Recovery Podcasts    Www.[place]recovery.org     DISCHARGE RESOURCES:  -SMART Recovery - self management for addiction recovery:  www.smartrecovery.org    -Pathways ~ A Health Crisis Resource & Support Center: 787.545.6405.  -[facility] 110-319-4428   -[facility] Behavioral Intake 361-579-7179 or 158-923-7080.  -Suicide Awareness Voices of Education (SAVE) (www.save.org): 260-685-AFCC (9284)  -National Suicide Prevention Line (www.mentalhealthmn.org): 164-791-EMAI (5551)  -National [place] on Mental Illness (www.[place].grecia.org): 490.209.9525 or 629-498-0065.  -Dhqz3ntnj: text the word LIFE to 27172 for immediate support and crisis intervention  -Mental Health Consumer/Survivor Network of [place] (www.mhcsn.net): 679.267.2866 or 792-714-5215  -Mental Health Association of [place] (www.mentalhealth.org): 742.224.2233 or 150-883-3760     -Substance Abuse and Mental Health Services (www.samhsa.gov)  -Harm Reduction Coalition (www. Harmreduction.org)  -www.prescribetoprevent.org or http://prescribetoprevent.org/video  -Poison control 3-344-361-8831   **[place] Opioid Prevention Coalition: " www.opioidcoalition.org    Sober Support Group Information:  AA/NA & Sponsor/Support  -Alcoholics Anonymous (www.alcoholics-anonymous.org): for local information 24 hours/day  -AA Intergroup service office in [place] (http://www.aastpaul.org/) 742.134.5112  -AA Intergroup service office in [facility]: 408.211.5812. (http://www.a[place].org/)  -Narcotics Anonymous (www.nam[place].org) (261) 443-5013   **Sober Fun Activities: www.soberHavkraftactivities.TabSys/[facility]//[facility] Recovery Connection ([facility])  [facility] connects people seeking recovery to resources that help foster and sustain long-term recovery.  Whether you are seeking resources for treatment, transportation, housing, job training, education, health care or other pathways to recovery, [facility] is a great place to start.    Phone: 670.110.9458.  www.[place]Sensicore.TravelCLICK (Great listing of all types of recovery and non-recovery related resources)      General Medication Instructions:   See your medication sheet(s) for instructions.   Take all medicines as directed.  Make no changes unless your doctor suggests them.   Go to all your doctor visits.  Be sure to have all your required lab tests. This way, your medicines can be refilled on time.  Do not use any drugs not prescribed by your provider.  AA/NA and Sponsors are excellent resources for support  Avoid alcohol.    Any follow up concerns:  Nursing questions call the Unit [facility] [phone]  Medical Record call [phone]  Outpatient Behavioral Intake call [phone]  LP+ Wait List/Bed Availability call [phone]    The entire treatment team has appreciated the opportunity to work with you Wilber.  We wish you the best in the future and with your lifelong recovery goals. Please bring this discharge folder with you to all follow up appointments.  It contains your lab results, diagnosis, medication list and discharge recommendations.    THANK YOU FOR CHOOSING THE [place]  [facility]

## 2020-12-30 NOTE — PLAN OF CARE
Pt remains on MSSA for alcohol withdrawal, scores of 3, 1 and was not medicated this shift. Anticipate Pt will come out of detox at 12/30 morning check. Pt would like to discharge on Wednesday 12/30    Pt endorsed anxiety and received PRN hydroxyzine. Denied SI

## 2020-12-30 NOTE — PLAN OF CARE
The patient appeared to sleep throughout the night with no issues reported.  No PRN medications given.  MSSA score was 2. No valium given.

## 2020-12-30 NOTE — DISCHARGE SUMMARY
Psychiatric Discharge Summary    Wilber Chapman MRN# [id number]   Age: 35 year old YOB: 1985     Date of Admission:  12/27/2020  Date of Discharge:  12/30/2020  1:26 PM  Admitting Physician:  Gurwinder García MD  Discharge Physician:  Ricardo Mcgowan MD          Event Leading to Hospitalization:   HISTORY OF PRESENT ILLNESS:  The patient presents as a reasonably reliable historian and reports history of excessive alcohol drinking for many years.  He is originally from [place], but lives in [facility], came home to [place] to visit his family during Christmas, said that family encouraged him to enroll into chemical dependency treatment program in [place].  Reported that he has been increasingly drinking on pretty much a daily basis, averaging 5-6 drinks, typically hard liquor, per day.  He reports history of shakes but no seizures, no detox, insomnia, night sweats, anxiety, denied any other mental health symptoms.  He says that he does have a psychiatrist in [facility] who was treating him with Viibryd, Klonopin and naltrexone.        See Admission note by Gurwinder García MD for additional details.          Diagnoses:     Alcohol withdrawal with unspecified complication   MDD, recurrent, moderate         Labs:        Lab Results   Component Value Date     12/27/2020    Lab Results   Component Value Date    CHLORIDE 101 12/27/2020    Lab Results   Component Value Date    BUN 6 12/27/2020      Lab Results   Component Value Date    POTASSIUM 3.6 12/27/2020    Lab Results   Component Value Date    CO2 28 12/27/2020    Lab Results   Component Value Date    CR 0.77 12/27/2020          Lab Results   Component Value Date    WBC 5.3 12/27/2020    HGB 13.6 12/27/2020    HCT 40.9 12/27/2020    MCV 97 12/27/2020     (L) 12/27/2020     Lab Results   Component Value Date     (H) 12/27/2020     (H) 12/27/2020    ALKPHOS 86 12/27/2020    BILITOTAL 0.7 12/27/2020      Lab Results   Component Value Date    TSH 1.33 12/28/2020            Consults:   Consultation during this admission received from internal medicine:    Wilber Chapman is a 35 year old year old male with a history of depressive disorder and substance use who is admitted to [facility] seeking alcohol detox. Internal Medicine consultation was ordered by Dr. Mcgowan for general medical evaluation       Alcohol withdrawal:  Alcohol use disorder:   Per ED: reported heavy drinking for ~2 months, 5-6 glasses bourbon daily and sometimes wine. Last drink reported on 12/25.  Patient denies ever having history of alcohol withdrawal seizure.  -Management per psychiatry team.   -MSSA protocol in place.     Depression:  Anxiety:   Home medications include vilazodone and clonazepam.    -Continues on vilazadone per psychiatry.         Hospital Course:   Wilber Chapman was admitted to [facility] with attending Ricardo Mcgowan MD  as a voluntary patient. The patient was placed under status 15 (15 minute checks) to ensure patient safety.   MSSA protocol was initiated due to the patient's history of alcohol abuse and concern for withdrawal symptoms.  CBC, BMP and utox obtained.    All outpatient medications were continued with the exception of Naltrexone which was restarted after detox was completed.     Wilber Chapman did participate in groups and was visible in the milieu.     The patient's symptoms of withdrawal improved.     Wilber Chapman was released to home. At the time of discharge Wilber Chapman was determined to not be a danger to himself or others. At the current time of discharge, the patient does not meet criteria for involuntary hospitalization. On the day of discharge, the patient reports that they do not have suicidal or homicidal ideation and would never hurt themselves or others. Steps taken to minimize risk include: assessing patient s behavior and thought process daily during hospital stay, discharging patient with  adequate plan for follow up for mental and physical health and discussing safety plan of returning to the hospital should the patient ever have thoughts of harming themselves or others. Therefore, based on all available evidence including the factors cited above, the patient does not appear to be at imminent risk for self-harm, and is appropriate for outpatient level of care.           Discharge Medications:     Current Discharge Medication List      START taking these medications    Details   folic acid (FOLVITE) 1 MG tablet Take 1 tablet (1 mg) by mouth daily  Qty: 30 tablet, Refills: 1    Associated Diagnoses: Alcohol dependence with withdrawal with complication (H)      gabapentin (NEURONTIN) 300 MG capsule Take 1 capsule (300 mg) by mouth 3 times daily as needed (anxiety)  Qty: 60 capsule, Refills: 1    Associated Diagnoses: Current moderate episode of major depressive disorder, unspecified whether recurrent (H)      multivitamin w/minerals (THERA-VIT-M) tablet Take 1 tablet by mouth daily  Qty: 30 tablet, Refills: 1    Associated Diagnoses: Alcohol dependence with withdrawal with complication (H)         CONTINUE these medications which have CHANGED    Details   naltrexone (DEPADE/REVIA) 50 MG tablet Take 1 tablet (50 mg) by mouth daily  Qty: 30 tablet, Refills: 3    Associated Diagnoses: Alcohol dependence with withdrawal with complication (H)      vilazodone (VIIBRYD) 10 MG TABS tablet Take 1 tablet (10 mg) by mouth daily  Qty: 30 tablet, Refills: 1    Associated Diagnoses: Current moderate episode of major depressive disorder, unspecified whether recurrent (H)         CONTINUE these medications which have NOT CHANGED    Details   melatonin 5 MG tablet Take 1 tablet (5 mg) by mouth nightly as needed for sleep  Qty: 30 tablet, Refills: 1    Associated Diagnoses: Current moderate episode of major depressive disorder, unspecified whether recurrent (H)         STOP taking these medications       clonazePAM  (KLONOPIN) 1 MG tablet Comments:   Reason for Stopping:         multivitamin, therapeutic (THERA-VIT) TABS tablet Comments:   Reason for Stopping:         traZODone (DESYREL) 50 MG tablet Comments:   Reason for Stopping:                    Psychiatric Examination:   Appearance:  awake, alert and adequately groomed  Attitude:  cooperative  Eye Contact:  good  Mood:  good  Affect:  mood congruent  Speech:  clear, coherent  Psychomotor Behavior:  no evidence of tardive dyskinesia, dystonia, or tics  Thought Process:  goal oriented  Associations:  no loose associations  Thought Content:  no evidence of suicidal ideation or homicidal ideation and no evidence of psychotic thought  Insight:  fair  Judgment:  intact  Oriented to:  time, person, and place  Attention Span and Concentration:  intact  Recent and Remote Memory:  fair  Language: Able to read and write  Fund of Knowledge: appropriate  Muscle Strength and Tone: normal  Gait and Station: Normal         Discharge Plan:   Continue medications as above.     Major Treatments, Procedures and Findings:  You were detoxed from alcohol with the Modified Selective Severity Protocol using Valium. You have met with a  to develop a treatment plan for discharge.  You have had labs drawn and a copy of those labs will be sent home with you.  Please bring your lab results with to your follow up doctor appointment.     Symptoms to Report:  If you experience more anxiety, confusion, sleeplessness, deep sadness or thoughts of suicide, notify your treatment team or notify your primary care physician. IF ANY OF THE SYMPTOMS YOU ARE EXPERIENCING ARE A MEDICAL EMERGENCY CALL 911 IMMEDIATELY.      Lifestyle Adjustment: Adjust your lifestyle to get enough sleep, relaxation, exercise and  good nutrition. Continue to develop healthy coping skills to decrease stress and promote a sober living environment. Do not use alcohol, illegal drugs or addictive medications other than what is  currently prescribed. AA, NA, and  Sponsor are excellent resources for support.      Primary Provider: Please follow up with your primary care provider within 30 days of discharge     Disposition: Returning to [place]        Facts about COVID19 at www.cdc.gov/COVID19 and www.[place].gov/covid19     Keeping hands clean is one of the most important steps we can take to avoid getting sick and spreading germs to others.  Please wash your hands frequently and lather with soap for at least 20 seconds!     Follow-up Appointment:   Please make a follow up appointment with your psychiatrist for within 30 days of discharge     Attestation:    Video-Visit Details    Type of service:  Video Visit    Video Start Time (time video started): 0815    Video End Time (time video stopped): 0820    Originating Location (pt. Location): Other [facility]    Distant Location (provider location): Provider remote location    Mode of Communication:  Video Conference via [employer]    Physician has received verbal consent for a Video Visit from the patient? Yes    Ricardo Mcgowan MD    The patient was seen and evaluated by me. I spent more than 30 minutes on discharge day activities. Ricardo Mcgowan MD